# Patient Record
Sex: FEMALE | Race: WHITE | NOT HISPANIC OR LATINO | Employment: UNEMPLOYED | URBAN - NONMETROPOLITAN AREA
[De-identification: names, ages, dates, MRNs, and addresses within clinical notes are randomized per-mention and may not be internally consistent; named-entity substitution may affect disease eponyms.]

---

## 2018-04-12 DIAGNOSIS — M25.511 RIGHT SHOULDER PAIN, UNSPECIFIED CHRONICITY: Primary | ICD-10-CM

## 2019-08-21 DIAGNOSIS — M25.532 LEFT WRIST PAIN: Primary | ICD-10-CM

## 2019-08-26 ENCOUNTER — APPOINTMENT (OUTPATIENT)
Dept: GENERAL RADIOLOGY | Facility: HOSPITAL | Age: 69
End: 2019-08-26

## 2019-12-06 DIAGNOSIS — M25.511 RIGHT SHOULDER PAIN, UNSPECIFIED CHRONICITY: Primary | ICD-10-CM

## 2019-12-11 ENCOUNTER — APPOINTMENT (OUTPATIENT)
Dept: GENERAL RADIOLOGY | Facility: HOSPITAL | Age: 69
End: 2019-12-11

## 2020-01-20 ENCOUNTER — APPOINTMENT (OUTPATIENT)
Dept: GENERAL RADIOLOGY | Facility: HOSPITAL | Age: 70
End: 2020-01-20

## 2020-05-26 ENCOUNTER — OFFICE VISIT (OUTPATIENT)
Dept: SURGERY | Facility: CLINIC | Age: 70
End: 2020-05-26

## 2020-05-26 VITALS
BODY MASS INDEX: 21.31 KG/M2 | WEIGHT: 124.8 LBS | HEART RATE: 71 BPM | SYSTOLIC BLOOD PRESSURE: 128 MMHG | HEIGHT: 64 IN | RESPIRATION RATE: 16 BRPM | TEMPERATURE: 98.2 F | DIASTOLIC BLOOD PRESSURE: 73 MMHG

## 2020-05-26 DIAGNOSIS — K21.9 GASTROESOPHAGEAL REFLUX DISEASE, ESOPHAGITIS PRESENCE NOT SPECIFIED: Primary | ICD-10-CM

## 2020-05-26 PROCEDURE — 99203 OFFICE O/P NEW LOW 30 MIN: CPT | Performed by: SURGERY

## 2020-05-26 RX ORDER — HYDROCODONE BITARTRATE AND ACETAMINOPHEN 7.5; 325 MG/1; MG/1
TABLET ORAL
COMMUNITY
Start: 2020-04-27

## 2020-05-26 RX ORDER — TOLTERODINE TARTRATE 2 MG/1
TABLET, EXTENDED RELEASE ORAL
COMMUNITY
Start: 2015-03-17

## 2020-05-26 RX ORDER — PRAVASTATIN SODIUM 80 MG/1
TABLET ORAL
COMMUNITY
Start: 2020-05-08

## 2020-05-26 RX ORDER — ALENDRONATE SODIUM 70 MG/1
TABLET ORAL
COMMUNITY
Start: 2020-05-08

## 2020-05-27 PROBLEM — L05.91 PILONIDAL CYST WITHOUT ABSCESS: Status: ACTIVE | Noted: 2020-05-27

## 2020-05-27 RX ORDER — ONDANSETRON 4 MG/1
4 TABLET, FILM COATED ORAL DAILY PRN
Qty: 30 TABLET | Refills: 1 | Status: SHIPPED | OUTPATIENT
Start: 2020-05-27 | End: 2021-05-27

## 2020-06-09 ENCOUNTER — TRANSCRIBE ORDERS (OUTPATIENT)
Dept: ADMINISTRATIVE | Facility: HOSPITAL | Age: 70
End: 2020-06-09

## 2020-06-09 DIAGNOSIS — Z01.818 PRE-OPERATIVE CLEARANCE: Primary | ICD-10-CM

## 2020-06-10 ENCOUNTER — TELEPHONE (OUTPATIENT)
Dept: SURGERY | Facility: CLINIC | Age: 70
End: 2020-06-10

## 2020-06-11 ENCOUNTER — TELEPHONE (OUTPATIENT)
Dept: SURGERY | Facility: CLINIC | Age: 70
End: 2020-06-11

## 2020-09-10 ENCOUNTER — TRANSCRIBE ORDERS (OUTPATIENT)
Dept: ADMINISTRATIVE | Facility: HOSPITAL | Age: 70
End: 2020-09-10

## 2020-09-10 DIAGNOSIS — Z01.818 PREOPERATIVE EXAMINATION, UNSPECIFIED: Primary | ICD-10-CM

## 2020-09-28 ENCOUNTER — TRANSCRIBE ORDERS (OUTPATIENT)
Dept: ADMINISTRATIVE | Facility: HOSPITAL | Age: 70
End: 2020-09-28

## 2020-09-28 DIAGNOSIS — Z01.818 PREOP EXAMINATION: Primary | ICD-10-CM

## 2020-09-29 ENCOUNTER — LAB (OUTPATIENT)
Dept: LAB | Facility: HOSPITAL | Age: 70
End: 2020-09-29

## 2020-09-29 DIAGNOSIS — Z01.818 PREOP EXAMINATION: ICD-10-CM

## 2020-09-29 PROCEDURE — U0004 COV-19 TEST NON-CDC HGH THRU: HCPCS

## 2020-09-29 PROCEDURE — C9803 HOPD COVID-19 SPEC COLLECT: HCPCS

## 2020-09-30 LAB — SARS-COV-2 RNA NOSE QL NAA+PROBE: NOT DETECTED

## 2021-02-17 DIAGNOSIS — Z23 IMMUNIZATION DUE: ICD-10-CM

## 2023-08-16 ENCOUNTER — OFFICE VISIT (OUTPATIENT)
Dept: CARDIOLOGY | Facility: CLINIC | Age: 73
End: 2023-08-16
Payer: MEDICARE

## 2023-08-16 VITALS
BODY MASS INDEX: 22.57 KG/M2 | HEART RATE: 58 BPM | SYSTOLIC BLOOD PRESSURE: 126 MMHG | OXYGEN SATURATION: 96 % | HEIGHT: 64 IN | DIASTOLIC BLOOD PRESSURE: 80 MMHG | WEIGHT: 132.2 LBS

## 2023-08-16 DIAGNOSIS — I73.9 PVD (PERIPHERAL VASCULAR DISEASE) WITH CLAUDICATION: ICD-10-CM

## 2023-08-16 DIAGNOSIS — E78.2 MIXED HYPERLIPIDEMIA: ICD-10-CM

## 2023-08-16 DIAGNOSIS — R00.2 PALPITATIONS: Primary | ICD-10-CM

## 2023-08-16 DIAGNOSIS — I15.0 RENOVASCULAR HYPERTENSION: ICD-10-CM

## 2023-08-16 PROCEDURE — 1160F RVW MEDS BY RX/DR IN RCRD: CPT | Performed by: INTERNAL MEDICINE

## 2023-08-16 PROCEDURE — 1159F MED LIST DOCD IN RCRD: CPT | Performed by: INTERNAL MEDICINE

## 2023-08-16 PROCEDURE — 99202 OFFICE O/P NEW SF 15 MIN: CPT | Performed by: INTERNAL MEDICINE

## 2023-08-16 PROCEDURE — 93000 ELECTROCARDIOGRAM COMPLETE: CPT | Performed by: INTERNAL MEDICINE

## 2023-08-16 RX ORDER — FLUTICASONE PROPIONATE 50 MCG
1 SPRAY, SUSPENSION (ML) NASAL DAILY
COMMUNITY

## 2023-08-16 RX ORDER — AMLODIPINE BESYLATE 2.5 MG/1
1 TABLET ORAL DAILY
COMMUNITY
Start: 2023-08-14 | End: 2023-08-16

## 2023-08-16 RX ORDER — ERGOCALCIFEROL 1.25 MG/1
50000 CAPSULE ORAL
COMMUNITY
Start: 2023-05-07

## 2023-08-16 RX ORDER — ESCITALOPRAM OXALATE 20 MG/1
20 TABLET ORAL DAILY
COMMUNITY

## 2023-08-16 RX ORDER — PANTOPRAZOLE SODIUM 40 MG/1
40 TABLET, DELAYED RELEASE ORAL DAILY
COMMUNITY

## 2023-08-16 RX ORDER — GABAPENTIN 100 MG/1
100 CAPSULE ORAL DAILY
COMMUNITY

## 2023-08-16 RX ORDER — ASPIRIN 81 MG/1
81 TABLET ORAL DAILY
COMMUNITY
Start: 2023-02-21

## 2023-10-11 ENCOUNTER — HOSPITAL ENCOUNTER (OUTPATIENT)
Dept: CARDIOLOGY | Facility: HOSPITAL | Age: 73
Discharge: HOME | End: 2023-10-11
Admitting: INTERNAL MEDICINE
Payer: MEDICARE

## 2023-10-11 DIAGNOSIS — I73.9 PVD (PERIPHERAL VASCULAR DISEASE) WITH CLAUDICATION: ICD-10-CM

## 2023-10-11 PROCEDURE — 93925 LOWER EXTREMITY STUDY: CPT

## 2023-11-09 ENCOUNTER — OFFICE VISIT (OUTPATIENT)
Dept: CARDIOLOGY | Facility: CLINIC | Age: 73
End: 2023-11-09
Payer: MEDICARE

## 2023-11-09 VITALS
WEIGHT: 128 LBS | OXYGEN SATURATION: 98 % | SYSTOLIC BLOOD PRESSURE: 133 MMHG | DIASTOLIC BLOOD PRESSURE: 77 MMHG | HEIGHT: 64 IN | HEART RATE: 62 BPM | BODY MASS INDEX: 21.85 KG/M2

## 2023-11-09 DIAGNOSIS — I15.0 RENOVASCULAR HYPERTENSION: ICD-10-CM

## 2023-11-09 DIAGNOSIS — E78.2 MIXED HYPERLIPIDEMIA: Primary | ICD-10-CM

## 2023-12-21 ENCOUNTER — OFFICE VISIT (OUTPATIENT)
Dept: CARDIOLOGY | Facility: CLINIC | Age: 73
End: 2023-12-21
Payer: MEDICARE

## 2023-12-21 VITALS
BODY MASS INDEX: 21.95 KG/M2 | SYSTOLIC BLOOD PRESSURE: 124 MMHG | DIASTOLIC BLOOD PRESSURE: 76 MMHG | HEIGHT: 64 IN | OXYGEN SATURATION: 96 % | WEIGHT: 128.6 LBS | HEART RATE: 65 BPM

## 2023-12-21 DIAGNOSIS — I25.118 CORONARY ARTERY DISEASE OF NATIVE ARTERY OF NATIVE HEART WITH STABLE ANGINA PECTORIS: ICD-10-CM

## 2023-12-21 DIAGNOSIS — E78.2 MIXED HYPERLIPIDEMIA: ICD-10-CM

## 2023-12-21 DIAGNOSIS — I71.11 RUPTURED ANEURYSM OF ASCENDING AORTA: Primary | ICD-10-CM

## 2023-12-21 DIAGNOSIS — I71.21 ANEURYSM OF ASCENDING AORTA WITHOUT RUPTURE: ICD-10-CM

## 2024-02-29 ENCOUNTER — TELEPHONE (OUTPATIENT)
Dept: CARDIOLOGY | Facility: CLINIC | Age: 74
End: 2024-02-29
Payer: MEDICARE

## 2024-02-29 RX ORDER — LOSARTAN POTASSIUM 25 MG/1
25 TABLET ORAL DAILY
Qty: 30 TABLET | Refills: 12 | Status: SHIPPED | OUTPATIENT
Start: 2024-02-29

## 2024-06-25 ENCOUNTER — HOSPITAL ENCOUNTER (OUTPATIENT)
Dept: CT IMAGING | Facility: HOSPITAL | Age: 74
Discharge: HOME | End: 2024-06-25
Admitting: INTERNAL MEDICINE
Payer: MEDICARE

## 2024-06-25 DIAGNOSIS — I71.21 ANEURYSM OF ASCENDING AORTA WITHOUT RUPTURE: ICD-10-CM

## 2024-06-25 LAB — CREAT BLDA-MCNC: 0.5 MG/DL (ref 0.6–1.3)

## 2024-06-25 PROCEDURE — 71270 CT THORAX DX C-/C+: CPT | Performed by: RADIOLOGY

## 2024-06-25 PROCEDURE — 25510000001 IOPAMIDOL 61 % SOLUTION: Performed by: INTERNAL MEDICINE

## 2024-06-25 PROCEDURE — 71270 CT THORAX DX C-/C+: CPT

## 2024-06-25 PROCEDURE — 82565 ASSAY OF CREATININE: CPT

## 2024-06-25 RX ADMIN — IOPAMIDOL 70 ML: 612 INJECTION, SOLUTION INTRAVENOUS at 14:31

## 2024-07-01 ENCOUNTER — TELEPHONE (OUTPATIENT)
Dept: CARDIOLOGY | Facility: CLINIC | Age: 74
End: 2024-07-01
Payer: MEDICARE

## 2024-11-20 ENCOUNTER — TELEPHONE (OUTPATIENT)
Dept: CARDIOLOGY | Facility: CLINIC | Age: 74
End: 2024-11-20

## 2024-12-19 ENCOUNTER — OFFICE VISIT (OUTPATIENT)
Dept: CARDIOLOGY | Facility: CLINIC | Age: 74
End: 2024-12-19
Payer: MEDICARE

## 2024-12-19 VITALS
HEIGHT: 64 IN | DIASTOLIC BLOOD PRESSURE: 75 MMHG | HEART RATE: 62 BPM | SYSTOLIC BLOOD PRESSURE: 154 MMHG | OXYGEN SATURATION: 97 % | WEIGHT: 138.6 LBS | BODY MASS INDEX: 23.66 KG/M2

## 2024-12-19 DIAGNOSIS — I15.0 RENOVASCULAR HYPERTENSION: ICD-10-CM

## 2024-12-19 DIAGNOSIS — I71.21 ANEURYSM OF ASCENDING AORTA WITHOUT RUPTURE: ICD-10-CM

## 2024-12-19 DIAGNOSIS — R07.89 OTHER CHEST PAIN: Primary | ICD-10-CM

## 2024-12-19 PROBLEM — I71.11 RUPTURED ANEURYSM OF ASCENDING AORTA: Status: RESOLVED | Noted: 2023-12-21 | Resolved: 2024-12-19

## 2024-12-19 PROBLEM — I71.9 AORTIC ANEURYSM: Status: ACTIVE | Noted: 2024-12-19

## 2024-12-19 PROCEDURE — 99214 OFFICE O/P EST MOD 30 MIN: CPT | Performed by: INTERNAL MEDICINE

## 2024-12-19 RX ORDER — LOSARTAN POTASSIUM 25 MG/1
37.5 TABLET ORAL DAILY
Qty: 45 TABLET | Refills: 12 | Status: SHIPPED | OUTPATIENT
Start: 2024-12-19

## 2024-12-19 NOTE — PROGRESS NOTES
Office Note    Subjective     Irina Espinal is a 74 y.o. female who presents to day for follow-up      Active Problems:  Problem List Items Addressed This Visit          Cardiac and Vasculature    Renovascular hypertension    Relevant Medications    losartan (Cozaar) 25 MG tablet    Aortic aneurysm    Other chest pain - Primary       HPI  Patient is a pleasant 74-year-old female past medical history significant for hypertension, hyperlipidemia, ascending aortic aneurysm of 4.1 size.  Comes in for follow-up.  Patient has a minimal discomfort in the chest at times.  Negative stress test in 2023.  She has chest pain which lasts only for few seconds does not radiate.  Denies any dyspnea on exertion or paroxysmal nocturnal dyspnea.  No lower extremity edema no palpitations no blackouts.  Has breath on moderate exertion only.    PRIOR MEDS  Current Outpatient Medications on File Prior to Visit   Medication Sig Dispense Refill    aspirin 81 MG EC tablet Take 1 tablet by mouth Daily.      escitalopram (LEXAPRO) 20 MG tablet Take 1 tablet by mouth Daily.      fluticasone (FLONASE) 50 MCG/ACT nasal spray Administer 1 spray into the nostril(s) as directed by provider Daily.      gabapentin (NEURONTIN) 100 MG capsule Take 1 capsule by mouth Daily.      HYDROcodone-acetaminophen (NORCO) 7.5-325 MG per tablet Every 6 (Six) Hours As Needed.      pantoprazole (PROTONIX) 40 MG EC tablet Take 1 tablet by mouth Daily.      pravastatin (PRAVACHOL) 80 MG tablet Take 1 tablet by mouth Daily.      tolterodine (DETROL) 2 MG tablet Take 1 tablet by mouth Daily.      [DISCONTINUED] losartan (Cozaar) 25 MG tablet Take 1 tablet by mouth Daily. 30 tablet 12    [DISCONTINUED] alendronate (FOSAMAX) 70 MG tablet  (Patient not taking: Reported on 12/19/2024)      [DISCONTINUED] vitamin D (ERGOCALCIFEROL) 1.25 MG (98317 UT) capsule capsule Take 1 capsule by mouth Every 7 (Seven) Days. (Patient not taking: Reported on 12/19/2024)       No current  "facility-administered medications on file prior to visit.       ALLERGIES  Patient has no known allergies.    HISTORY  History reviewed. No pertinent past medical history.    Social History     Socioeconomic History    Marital status:    Tobacco Use    Smoking status: Never   Vaping Use    Vaping status: Never Used   Substance and Sexual Activity    Alcohol use: Defer    Drug use: Defer    Sexual activity: Defer       Family History   Problem Relation Age of Onset    Hypertension Mother     Heart failure Mother     Cancer Father        Review of Systems   Respiratory:  Positive for shortness of breath. Negative for chest tightness.    Cardiovascular:  Negative for chest pain, palpitations and leg swelling.   Neurological:  Negative for dizziness, light-headedness and headaches.       Objective     VITALS: /75 (BP Location: Left arm, Patient Position: Sitting, Cuff Size: Adult)   Pulse 62   Ht 162.6 cm (64\")   Wt 62.9 kg (138 lb 9.6 oz)   SpO2 97%   BMI 23.79 kg/m²     LABS:   No visits with results within 3 Month(s) from this visit.   Latest known visit with results is:   Hospital Outpatient Visit on 06/25/2024   Component Date Value Ref Range Status    Creatinine 06/25/2024 0.50 (L)  0.60 - 1.30 mg/dL Final    Serial Number: [id number]:  [id number]         IMAGING:   XR Wrist 2 View Left    Result Date: 11/18/2024   1. No acute fracture.  2. 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  3. Widening between the scaphoid and the lunate bones which may represent ligamentous insufficiency/tear.  4. Chondrocalcinosis of the triangular fibrocartilage, a finding often associated with CPPD arthropathy.  Created by: Collin Abreu DO  Signed by: Collin Abreu DO  Signed on: 11/18/2024 1:40 PM  Location: [id number]            XR Humerus Left    Result Date: 11/18/2024   Normal left humerus.  Created by: Collin Abreu DO  Signed by: Collin Abreu DO  Signed on: 11/18/2024 1:37 " PM  Location: [id number]            XR Forearm 2 Views Left    Result Date: 11/18/2024   No acute fracture.  Created by: Collin Abreu DO  Signed by: Collin Abreu DO  Signed on: 11/18/2024 1:31 PM  Location: [id number]            XR Elbow 3+ View Left    Result Date: 11/18/2024   Normal left elbow.  Created by: Collin Abreu DO  Signed by: Collin Abreu DO  Signed on: 11/18/2024 1:06 PM  Location: [id number]            No results found for this or any previous visit.     No results found for this or any previous visit.          EXAM:  Constitutional:       General: Awake.      Appearance: Healthy appearance. Not in distress.   Eyes:      Conjunctiva/sclera: Conjunctivae normal.   HENT:      Head: Normocephalic and atraumatic.      Nose: Nose normal.    Mouth/Throat:      Pharynx: Oropharynx is clear.   Neck:      Thyroid: Thyroid normal.      Vascular: No carotid bruit or JVD.   Pulmonary:      Effort: Pulmonary effort is normal.      Breath sounds: Normal breath sounds.   Chest:      Chest wall: Not tender to palpatation.   Cardiovascular:      PMI at left midclavicular line. Normal rate. Regular rhythm. Normal S1 with normal intensity. Normal S2 with normal intensity.       Murmurs: There is no murmur.      No gallop.  No rub.   Pulses:     Intact distal pulses.   Edema:     Peripheral edema absent.   Abdominal:      General: Bowel sounds are normal. There is no distension.      Palpations: Abdomen is soft. There is no hepatomegaly.      Tenderness: There is no abdominal tenderness.   Musculoskeletal: Normal range of motion.      Cervical back: Normal range of motion. Skin:     General: Skin is warm and dry. There is no cyanosis.      Coloration: Skin is not jaundiced.   Neurological:      Mental Status: Alert and oriented to person, place and time.      Motor: Motor function is intact.      Gait: Gait is intact.   Psychiatric:         Attention and Perception: Attention and perception normal.         Speech:  Speech normal.         Behavior: Behavior is cooperative.         Cognition and Memory: Cognition and memory normal.         Judgment: Judgment normal.          Procedure   Procedures       Assessment & Plan     Diagnoses and all orders for this visit:    1. Other chest pain (Primary)    2. Renovascular hypertension    3. Aneurysm of ascending aorta without rupture    Other orders  -     losartan (Cozaar) 25 MG tablet; Take 1.5 tablets by mouth Daily.  Dispense: 45 tablet; Refill: 12          PLAN  1. cardiac.  Patient with episodes of nonspecific chest pains.  Negative stress test in 2023 no cath.  Will continue to follow closely and clinically.  #2 aortic aneurysm.  Patient has mildly dilated ascending aorta at 4.1 cm.  May need to scan in a year's time.  #3 hypertension.  Systolic pressure high in clinic today.  Usually is better at home.  Will go up on losartan to 37.5 mg.  Systolic less than 130 diastolic less than 80 will be advised.  #4 hyperlipidemia.  Continue HMG Co. inhibitors.  Lipids and liver function followed by primary.           MEDS ORDERED DURING VISIT:    Medications Discontinued During This Encounter   Medication Reason    alendronate (FOSAMAX) 70 MG tablet *Therapy completed    losartan (Cozaar) 25 MG tablet *Therapy completed    vitamin D (ERGOCALCIFEROL) 1.25 MG (29598 UT) capsule capsule *Therapy completed        New Medications Ordered This Visit   Medications    losartan (Cozaar) 25 MG tablet     Sig: Take 1.5 tablets by mouth Daily.     Dispense:  45 tablet     Refill:  12         Follow Up:   Return in about 6 months (around 6/19/2025) for Recheck.    Patient was given instructions and counseling regarding her condition or for health maintenance advice. Please see specific information pulled into the AVS if appropriate.   As always, Bill Chavez APRN  I appreciate very much the opportunity to participate in the cardiovascular care of your patients. Please do not hesitate to call me  with any questions with regards to Irina Espinal evaluation and management.         This document has been electronically signed by Radha Freitas MD [facility], Interventional Cardiology  December 19, 2024 15:20 EST    Dictated Utilizing Dragon Dictation: Part of this note may be an electronic transcription/translation of spoken language to printed text using the Dragon Dictation System.

## 2025-03-13 ENCOUNTER — TELEPHONE (OUTPATIENT)
Dept: CARDIOLOGY | Facility: CLINIC | Age: 75
End: 2025-03-13

## 2025-03-13 NOTE — TELEPHONE ENCOUNTER
Caller: TIN PATEL    Relationship to patient: Emergency Contact    Best call back number: [phone]    Chief complaint: SOB AND CHEST PAIN, HIGH BP- NO ACTIVE SYMPTOMS    Type of visit: F/U    Requested date: 3.18.25 AROUND 130PM. TIN SAYS HER APPT IS AT THIS TIME AND WOULD LIKE TO BE SEEN AT THE SAME TIME AS HER MOTHER SO SHE CAN HEAR WHAT THE  SAYS.    If rescheduling, when is the original appointment: 6.19.25    Additional notes: PLEASE CALL TIN WHEN AVAILABLE TO DISCUSS.

## 2025-03-18 PROBLEM — I10 PRIMARY HYPERTENSION: Chronic | Status: ACTIVE | Noted: 2023-08-16

## 2025-03-18 PROBLEM — I10 PRIMARY HYPERTENSION: Status: ACTIVE | Noted: 2023-08-16

## 2025-03-18 PROBLEM — I71.9 AORTIC ANEURYSM: Chronic | Status: ACTIVE | Noted: 2024-12-19

## 2025-03-18 PROBLEM — I25.118 CORONARY ARTERY DISEASE OF NATIVE ARTERY OF NATIVE HEART WITH STABLE ANGINA PECTORIS: Chronic | Status: ACTIVE | Noted: 2023-12-21

## 2025-03-18 PROBLEM — E78.2 MIXED HYPERLIPIDEMIA: Chronic | Status: ACTIVE | Noted: 2023-08-16

## 2025-06-24 ENCOUNTER — OFFICE VISIT (OUTPATIENT)
Dept: CARDIOLOGY | Facility: CLINIC | Age: 75
End: 2025-06-24
Payer: MEDICAID

## 2025-06-24 VITALS
OXYGEN SATURATION: 93 % | BODY MASS INDEX: 23.15 KG/M2 | WEIGHT: 135.6 LBS | HEART RATE: 63 BPM | SYSTOLIC BLOOD PRESSURE: 126 MMHG | DIASTOLIC BLOOD PRESSURE: 73 MMHG | HEIGHT: 64 IN

## 2025-06-24 DIAGNOSIS — E61.1 IRON DEFICIENCY: Primary | ICD-10-CM

## 2025-06-24 DIAGNOSIS — I10 PRIMARY HYPERTENSION: Chronic | ICD-10-CM

## 2025-06-24 DIAGNOSIS — E78.2 MIXED HYPERLIPIDEMIA: Chronic | ICD-10-CM

## 2025-06-24 DIAGNOSIS — R00.2 PALPITATIONS: ICD-10-CM

## 2025-06-24 PROBLEM — I50.32 CHRONIC DIASTOLIC CONGESTIVE HEART FAILURE: Status: ACTIVE | Noted: 2025-06-24

## 2025-06-24 PROBLEM — I50.32 CHRONIC DIASTOLIC CONGESTIVE HEART FAILURE: Chronic | Status: ACTIVE | Noted: 2025-06-24

## 2025-06-24 PROCEDURE — 93000 ELECTROCARDIOGRAM COMPLETE: CPT | Performed by: NURSE PRACTITIONER

## 2025-06-24 PROCEDURE — 1159F MED LIST DOCD IN RCRD: CPT | Performed by: NURSE PRACTITIONER

## 2025-06-24 PROCEDURE — 1160F RVW MEDS BY RX/DR IN RCRD: CPT | Performed by: NURSE PRACTITIONER

## 2025-06-24 PROCEDURE — 3074F SYST BP LT 130 MM HG: CPT | Performed by: NURSE PRACTITIONER

## 2025-06-24 PROCEDURE — 3078F DIAST BP <80 MM HG: CPT | Performed by: NURSE PRACTITIONER

## 2025-06-24 PROCEDURE — 99214 OFFICE O/P EST MOD 30 MIN: CPT | Performed by: NURSE PRACTITIONER

## 2025-06-24 RX ORDER — LOSARTAN POTASSIUM 25 MG/1
37.5 TABLET ORAL DAILY
Qty: 135 TABLET | Refills: 3 | Status: SHIPPED | OUTPATIENT
Start: 2025-06-24

## 2025-06-24 RX ORDER — ERGOCALCIFEROL 1.25 MG/1
1 CAPSULE ORAL WEEKLY
COMMUNITY

## 2025-06-24 RX ORDER — FERROUS SULFATE 325(65) MG
325 TABLET ORAL
COMMUNITY

## 2025-06-24 NOTE — ASSESSMENT & PLAN NOTE
Labs from care reveal iron deficiency.  She is currently taking oral iron 3 times per week and suffering side effects from treatment.  Will reevaluate and if TSAT is less than 20 we will order IV iron

## 2025-06-24 NOTE — PROGRESS NOTES
"Chief Complaint  Follow-up (Patient states \" fluttering\" feeling )    Subjective          Irina Espinal presents to [facility] for follow up.    History of Present Illness    Ms. Espinal presents for follow-up of coronary artery disease, hypertension, hyperlipidemia, ascending aortic aneurysm.  Her last visit to clinic was 12/19/2020 for with Dr. Freitas.  At that time he increased her losartan.  History of Present Illness  She has been experiencing episodes of heart fluttering, approximately 2 to 3 times over the past few weeks. These episodes are brief and do not cause any associated pain, shortness of breath, or syncope. There is no history of heart failure.There is no report of lower extremity edema.    Despite starting iron supplementation, fatigue persists. There is no report of constipation, but occasional nausea is mentioned. Iron levels have not been reassessed since the initiation of the supplement. She reports fluctuating energy levels, with some days being more energetic than others.     Currently, she is on losartan, prescribed by this provider. Pravastatin is also taken but was not prescribed by this provider.       Tobacco Use: Unknown (6/24/2025)    Patient History     Smoking Tobacco Use: Never     Smokeless Tobacco Use: Unknown     Passive Exposure: Not on file       Objective     Vital Signs:   /73 (BP Location: Left arm, Patient Position: Sitting, Cuff Size: Adult)   Pulse 63   Ht 162.6 cm (64\")   Wt 61.5 kg (135 lb 9.6 oz)   SpO2 93%   BMI 23.28 kg/m²       Physical Exam  Vitals and nursing note reviewed. Exam conducted with a chaperone present (Adult daughter accompanies).   Constitutional:       General: She is not in acute distress.     Appearance: She is obese.   HENT:      Head: Normocephalic and atraumatic.   Neck:      Vascular: No carotid bruit.   Cardiovascular:      Rate and Rhythm: Normal rate and regular rhythm.      Heart sounds: No murmur heard.    "  No friction rub. No gallop.   Pulmonary:      Effort: Pulmonary effort is normal.      Breath sounds: Normal breath sounds.   Musculoskeletal:      Right lower leg: No edema.      Left lower leg: No edema.   Skin:     General: Skin is warm and dry.   Neurological:      General: No focal deficit present.      Mental Status: She is alert.   Psychiatric:         Mood and Affect: Mood normal.         Behavior: Behavior normal.             Result Review :            The following data was reviewed by me 06/19/25 at 08:32 EDT: cardiac and lab studies as detailed below.    Creatinine   Date Value Ref Range Status   06/25/2024 0.50 (L) 0.60 - 1.30 mg/dL Final     Comment:     Serial Number: [id number]:  [id number]       Labs from primary care dated February 13, 2025 reviewed.  A1c 5.7.  TSH 1.010.  CMP within normal limits.  CBC with hemoglobin of 13 and hematocrit of 40.1  Last Cardiac Cath 07/23/2023  Performed at [facility]-nonobstructive disease    Last Coronary CTA      Last Stress test 05/02/2023   Summary:   1.  Myocardial perfusion imaging: There is a small, mild, reversible defect involving the apical wall(s), consistent with ischemia, in the distribution of the left anterior descending coronary artery.    2.  Stress: Perfusion is moderately reduced in the apical lateral wall.    3.  Reversibility: 2-grade reversibility is seen in the apical lateral wall.    4.  Stress: The calculated left ventricular ejection fraction is 76%.      Last Echo 05/02/2023  Summary:   1.  Left ventricle: The cavity size is normal. Wall thickness is mildly increased. There is concentric hypertrophy. Systolic function is normal. The estimated ejection fraction is 60-65%. Grade I diastolic dysfunction.    2.  Aortic valve: Mild thickening, consistent with sclerosis. There is mild regurgitation.    3.  Mitral valve: There is mild regurgitation.         ECG 12 Lead    Date/Time: 6/24/2025 3:39 PM  Performed by:  Pamela Lowe APRN    Authorized by: Pamela Lowe APRN  Comparison: compared with previous ECG from 8/16/2023  Similar to previous ECG  Comparison to previous ECG: Sinus bradycardia with incomplete right bundle branch block and T wave inversions in lead II, lead III, aVF, V4 and V5.  Left axis deviation  Rhythm: sinus rhythm  Rate: normal  BPM: 66  Conduction: incomplete right bundle branch block  Conduction comments: QRS 96 ms  Q waves: V1    T inversion: III, aVF, V3, V4 and V5  T flattening: V6  QRS axis: left    Clinical impression: abnormal EKG           Current Outpatient Medications   Medication Sig Dispense Refill    aspirin 81 MG EC tablet Take 1 tablet by mouth Daily.      escitalopram (LEXAPRO) 20 MG tablet Take 1 tablet by mouth Daily.      ferrous sulfate 325 (65 FE) MG tablet Take 1 tablet by mouth Daily With Breakfast.      fluticasone (FLONASE) 50 MCG/ACT nasal spray Administer 1 spray into the nostril(s) as directed by provider Daily.      gabapentin (NEURONTIN) 100 MG capsule Take 1 capsule by mouth Daily.      HYDROcodone-acetaminophen (NORCO) 7.5-325 MG per tablet Every 6 (Six) Hours As Needed.      losartan (Cozaar) 25 MG tablet Take 1.5 tablets by mouth Daily. 135 tablet 3    pantoprazole (PROTONIX) 40 MG EC tablet Take 1 tablet by mouth Daily.      pravastatin (PRAVACHOL) 80 MG tablet Take 1 tablet by mouth Daily.      tolterodine (DETROL) 2 MG tablet Take 1 tablet by mouth Daily.      Vitamin D, Ergocalciferol, 87181 units capsule Take 1 capsule by mouth 1 (One) Time Per Week.       No current facility-administered medications for this visit.            Assessment and Plan    Diagnoses and all orders for this visit:    1. Iron deficiency (Primary)  Assessment & Plan:  Labs from care reveal iron deficiency.  She is currently taking oral iron 3 times per week and suffering side effects from treatment.  Will reevaluate and if TSAT is less than 20 we will order IV iron    Orders:  -      Ferritin; Future  -     Iron Profile w/o Ferritin; Future  -     CBC (No Diff); Future    2. Palpitations  -     Cardiac Event Monitor (RASHARD) or Mobile Cardiac Outpatient Telemetry (MCT); Future  -     ECG 12 Lead    3. Primary hypertension  Assessment & Plan:  Hypertension is Controlled  Goals of Care:Medication compliance and tolerance, Systolic blood pressure <130, and Diastolic blood pressure  <80  Plan:  Continue current medications  Follow up:in 6 months      Orders:  -     losartan (Cozaar) 25 MG tablet; Take 1.5 tablets by mouth Daily.  Dispense: 135 tablet; Refill: 3    4. Mixed hyperlipidemia  Assessment & Plan:   Currently managed by PCP.  No lipid panel on record or in the 40 page document of lab results that was sent from February 2025.  Factoring into risk factors for coronary artery disease.            Follow Up     Return in about 6 months (around 12/24/2025).    Patient was given instructions and counseling regarding her condition or for health maintenance advice. Please see specific information pulled into the AVS if appropriate.       Electronically signed by CLARIBEL Tsang, 06/24/25, 3:58 PM EDT.    Patient or patient representative verbalized consent for the use of Ambient Listening during the visit with  CLARIBEL Tsang for chart documentation. 6/24/2025  15:58 EDT     Dictated Utilizing Dragon Dictation: Part of this note may be an electronic transcription/translation of spoken language to printed text using the Dragon Dictation System

## 2025-06-24 NOTE — ASSESSMENT & PLAN NOTE
Currently managed by PCP.  No lipid panel on record or in the 40 page document of lab results that was sent from February 2025.  Factoring into risk factors for coronary artery disease.

## 2025-08-08 ENCOUNTER — RESULTS FOLLOW-UP (OUTPATIENT)
Dept: CARDIOLOGY | Facility: CLINIC | Age: 75
End: 2025-08-08
Payer: COMMERCIAL